# Patient Record
Sex: MALE | Employment: FULL TIME | URBAN - METROPOLITAN AREA
[De-identification: names, ages, dates, MRNs, and addresses within clinical notes are randomized per-mention and may not be internally consistent; named-entity substitution may affect disease eponyms.]

---

## 2022-11-10 ENCOUNTER — VIRTUAL VISIT (OUTPATIENT)
Dept: ORTHOPEDIC SURGERY | Age: 57
End: 2022-11-10
Payer: COMMERCIAL

## 2022-11-10 DIAGNOSIS — M19.011 PRIMARY OSTEOARTHRITIS OF BOTH SHOULDERS: ICD-10-CM

## 2022-11-10 DIAGNOSIS — M19.012 PRIMARY OSTEOARTHRITIS OF BOTH SHOULDERS: ICD-10-CM

## 2022-11-10 DIAGNOSIS — M17.0 PRIMARY OSTEOARTHRITIS OF BOTH KNEES: ICD-10-CM

## 2022-11-10 DIAGNOSIS — M19.022 PRIMARY OSTEOARTHRITIS OF BOTH ELBOWS: Primary | ICD-10-CM

## 2022-11-10 DIAGNOSIS — M19.021 PRIMARY OSTEOARTHRITIS OF BOTH ELBOWS: Primary | ICD-10-CM

## 2022-11-10 PROCEDURE — 99214 OFFICE O/P EST MOD 30 MIN: CPT | Performed by: FAMILY MEDICINE

## 2022-11-10 NOTE — PROGRESS NOTES
Haider Guzman 1965 is a 65 y.o. male who was seen by synchronous (real-time) audio-video technology doxy. me on 11/10/2022     This visit occurred during the TBEVQ-38 public health emergency. Consent:  He and/or his healthcare decision maker is aware that this patient-initiated Telehealth encounter is a billable service, with coverage as determined by his insurance carrier. He is aware that he may receive a bill and has provided verbal consent to proceed: Yes    I am at [facility] office while conducting this encounter, and the patient is at home alone. [id number]  Subjective:   Haider Guzman 1965 was seen as a new patient today for Knee Pain (bilateral), Elbow Pain (bilateral), and Shoulder Pain (bilateral)    Patients symptoms have been present for about a year. Pain level 5/10 above joints. 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 IMAGING: MRI elbows 11/18/2021  RIGHT ELBOW:   1. Severe degenerative changes of the radiocapitellar joint, including   full-thickness cartilage loss throughout the radial head and near   full-thickness to full-thickness cartilage loss throughout the opposing   capitellum with adjacent subchondral marrow edema, cystic changes, and   sclerosis. There is a trace elbow joint effusion with synovitis. 2.  Mild insertional tendinosis/degeneration of the proximal common   extensor tendon, and mild degeneration and fraying of the adjacent proximal   radial collateral ligament. 3.  Mild insertional tendinosis of the distal biceps and triceps   tendons. LEFT ELBOW:   1.  Mild to moderate degenerative changes of the radiocapitellar joint,   including areas of full-thickness cartilage loss involving the   radial/lateral aspect of the radial head and volar capitellum. 2.  Mild insertional tendinosis/degeneration of the proximal common   extensor tendon.    3. Mild to moderate insertional tendinosis of the distal biceps tendon,   and mild insertional tendinosis of the distal triceps tendon. 4.  Thickening and mild hyperintensity of the ulnar nerve within the   cubital tunnel, which is nonspecific but can be seen in the clinical   setting of ulnar neuritis (cubital tunnel syndrome). History reviewed. No pertinent surgical history. Social History     Socioeconomic History    Marital status:      History reviewed. No pertinent past medical history. History reviewed. No pertinent family history. ROS:  Some swell knees. All other systems reviewed and negative aside from that written in the HPI. PHYSICAL EXAMINATION:  [ INSTRUCTIONS:  \"[x]\" Indicates a positive item  \"[]\" Indicates a negative item  -- DELETE ALL ITEMS NOT EXAMINED]  Vital Signs: (As obtained by patient/caregiver at home)  There were no vitals taken for this visit.      Constitutional: [x] Appears well-developed and well-nourished [x] No apparent distress      Mental status: [x] Alert and awake  [x] Oriented to person/place/time [x] Able to follow commands      Eyes:   EOM    [x]  Normal  Sclera  [x]  Normal           Discharge [x]  None visible      HENT: [x] Normocephalic, atraumatic   [x] Mouth/Throat: Mucous membranes are moist  [x] Normal appereance external ears, nose    External Ears [x] Normal appearance    Neck: [x] No visualized mass, symmetrical    Pulmonary/Chest: [x] Respiratory effort normal   [x] No visualized signs of difficulty breathing or respiratory distress          Musculoskeletal:   [x] Normal gait with no signs of ataxia         [x] Normal range of motion of neck         Neurological:        [x] No Facial Asymmetry (Cranial nerve 7 motor function) (limited exam due to video visit)          [x] No gaze palsy             Skin:        [x] No significant exanthematous lesions or discoloration noted on facial skin           Psychiatric:       [x] Normal Affect [x] No Hallucinations       [x] Good insight/judgement    Other pertinent observable physical exam findings:- none        Assessment & Plan:       ICD-10-CM ICD-9-CM    1. Primary osteoarthritis of both elbows  M19.021 715.12     M19.022        2. Primary osteoarthritis of both knees  M17.0 715.16       3. Primary osteoarthritis of both shoulders  M19.011 715.11     M19.012          Had questions about PRP vs Stem Cell vs Rapamycin Tx for OA which I do not perform. Answered all questions and advised seek Tx w/ provider that do these interventions. Total time spent on encounter including reviewing chart/imaging/lab results and evaluation/documentation 35 minutes. We discussed the expected course, resolution and complications of the diagnosis(es) in detail. Medication risks, benefits, costs, interactions, and alternatives were discussed as indicated. I advised him to contact the office if his condition worsens, changes or fails to improve as anticipated. He expressed understanding with the diagnosis(es) and plan. Pursuant to the emergency declaration under the [facility] [address] [facility], [facility] waiver authority and the [employer] General Act, this Virtual  Visit was conducted, with patient's consent, to reduce the patient's risk of exposure to COVID-19. Services were provided through a video synchronous discussion virtually to substitute for in-person clinic visit.     Trenton Taylor DO, FAOASM

## 2022-11-10 NOTE — PROGRESS NOTES
Pt states that his pain level is 2 out of 10. Pt states that he has a lot of cracking. Pt states that loading/ lifting weights causes pain. Pt states that resting helps.